# Patient Record
Sex: MALE | Race: WHITE | NOT HISPANIC OR LATINO | Employment: UNEMPLOYED | URBAN - METROPOLITAN AREA
[De-identification: names, ages, dates, MRNs, and addresses within clinical notes are randomized per-mention and may not be internally consistent; named-entity substitution may affect disease eponyms.]

---

## 2022-11-06 ENCOUNTER — OFFICE VISIT (OUTPATIENT)
Dept: URGENT CARE | Facility: PHYSICIAN GROUP | Age: 37
End: 2022-11-06

## 2022-11-06 VITALS
RESPIRATION RATE: 20 BRPM | WEIGHT: 211.2 LBS | SYSTOLIC BLOOD PRESSURE: 139 MMHG | BODY MASS INDEX: 30.24 KG/M2 | DIASTOLIC BLOOD PRESSURE: 82 MMHG | HEIGHT: 70 IN | OXYGEN SATURATION: 95 % | HEART RATE: 114 BPM | TEMPERATURE: 98.2 F

## 2022-11-06 DIAGNOSIS — Z76.89 REFERRAL OF PATIENT: ICD-10-CM

## 2022-11-06 DIAGNOSIS — Z76.0 ENCOUNTER FOR MEDICATION REFILL: ICD-10-CM

## 2022-11-06 DIAGNOSIS — I10 ESSENTIAL HYPERTENSION: ICD-10-CM

## 2022-11-06 PROCEDURE — 99212 OFFICE O/P EST SF 10 MIN: CPT | Performed by: STUDENT IN AN ORGANIZED HEALTH CARE EDUCATION/TRAINING PROGRAM

## 2022-11-06 RX ORDER — HYDROCHLOROTHIAZIDE 12.5 MG/1
12.5 TABLET ORAL DAILY
COMMUNITY

## 2022-11-06 RX ORDER — LISINOPRIL AND HYDROCHLOROTHIAZIDE 20; 12.5 MG/1; MG/1
1 TABLET ORAL DAILY
Qty: 30 TABLET | Refills: 0 | Status: CANCELLED | OUTPATIENT
Start: 2022-11-06 | End: 2022-12-06

## 2022-11-06 RX ORDER — LISINOPRIL AND HYDROCHLOROTHIAZIDE 12.5; 1 MG/1; MG/1
1 TABLET ORAL DAILY
COMMUNITY
End: 2022-11-06

## 2022-11-06 RX ORDER — LISINOPRIL AND HYDROCHLOROTHIAZIDE 20; 12.5 MG/1; MG/1
1 TABLET ORAL DAILY
Qty: 30 TABLET | Refills: 0 | Status: SHIPPED | OUTPATIENT
Start: 2022-11-06 | End: 2022-12-05 | Stop reason: SDUPTHER

## 2022-11-06 ASSESSMENT — ENCOUNTER SYMPTOMS
FEVER: 0
ORTHOPNEA: 0
DIARRHEA: 0
PALPITATIONS: 0
CONSTIPATION: 0
VOMITING: 0
DIZZINESS: 0
DOUBLE VISION: 0
HEADACHES: 0
PHOTOPHOBIA: 0
CHILLS: 0
NAUSEA: 0
ABDOMINAL PAIN: 0
WHEEZING: 0
BLURRED VISION: 0

## 2022-11-06 NOTE — PROGRESS NOTES
"Subjective     Qasim Vázquez is a 37 y.o. male who presents with Medication Refill (Pt requesting med refill of all meds but mostly lisinopril-hydrochlorothiazide  )            Qasim is 37 y.o. male who presents for medication refill.  Patient States that his primary care provider has recently changed practices so he is without a PCP.  Patient is requesting medication refill on his antihypertensive medication (lisinopril-hydrochlorothiazide 20-12.5 mg : 1 tablet daily). Patient brought his prescription bottle with him.  Patient states he has not taken his medication for the past 3 days due to running out.  He is unable to get refills so presents to urgent care for med refill until he can establish care with new primary care provider.      Review of Systems   Constitutional:  Negative for chills, fever and malaise/fatigue.   Eyes:  Negative for blurred vision, double vision and photophobia.   Respiratory:  Negative for wheezing.    Cardiovascular:  Negative for chest pain, palpitations and orthopnea.   Gastrointestinal:  Negative for abdominal pain, constipation, diarrhea, nausea and vomiting.   Neurological:  Negative for dizziness and headaches.   All other systems reviewed and are negative.           Objective     BP (!) 152/90 (BP Location: Left arm, Patient Position: Sitting, BP Cuff Size: Adult)   Pulse (!) 114   Temp 36.8 °C (98.2 °F) (Temporal)   Resp 20   Ht 1.778 m (5' 10\")   Wt 95.8 kg (211 lb 3.2 oz)   SpO2 95%   BMI 30.30 kg/m²      Physical Exam  Vitals reviewed.   Constitutional:       Appearance: Normal appearance.   HENT:      Head: Normocephalic and atraumatic.   Eyes:      Extraocular Movements: Extraocular movements intact.      Conjunctiva/sclera: Conjunctivae normal.      Pupils: Pupils are equal, round, and reactive to light.   Cardiovascular:      Rate and Rhythm: Normal rate and regular rhythm.   Pulmonary:      Effort: Pulmonary effort is normal.      Breath sounds: Normal breath " sounds.   Neurological:      General: No focal deficit present.      Mental Status: He is alert. Mental status is at baseline.                           Assessment & Plan        1. Essential hypertension  - lisinopril-hydrochlorothiazide (PRINZIDE) 20-12.5 MG per tablet; Take 1 Tablet by mouth every day for 30 days.  Dispense: 30 Tablet; Refill: 0    2. Referral of patient  - Referral to establish with [facility] PCP    3. Encounter for medication refill  - lisinopril-hydrochlorothiazide (PRINZIDE) 20-12.5 MG per tablet; Take 1 Tablet by mouth every day for 30 days.  Dispense: 30 Tablet; Refill: 0  - Referral to establish with [facility] PCP     Patient presents today for med refill of antihypertensive medication.  Refill sent to pharmacy.  Patient otherwise well-appearing in clinic today.  Referral placed for patient to establish care with primary care provider and advised to establish care and follow-up with PCP regarding future refills.      Instructed to return to urgent care or nearest emergency department for any change in condition, further concerns, or new concerning symptoms.    Patient states understanding and agrees with the plan of care and discharge instructions.

## 2022-11-07 ENCOUNTER — TELEPHONE (OUTPATIENT)
Dept: SCHEDULING | Facility: IMAGING CENTER | Age: 37
End: 2022-11-07

## 2022-11-30 ENCOUNTER — TELEPHONE (OUTPATIENT)
Dept: URGENT CARE | Facility: PHYSICIAN GROUP | Age: 37
End: 2022-11-30

## 2022-11-30 NOTE — TELEPHONE ENCOUNTER
Qasim Bobby called [place] today and was wanting to speak with you about a refill for lisinopril-hydrochlorothiazide (PRINZIDE). He stated that he should have another refill due to not having his insurance.     Thank you!

## 2022-12-05 RX ORDER — LISINOPRIL AND HYDROCHLOROTHIAZIDE 20; 12.5 MG/1; MG/1
1 TABLET ORAL DAILY
Qty: 30 TABLET | Refills: 0 | Status: SHIPPED | OUTPATIENT
Start: 2022-12-05 | End: 2023-01-04